# Patient Record
Sex: FEMALE | Race: WHITE | NOT HISPANIC OR LATINO | URBAN - NONMETROPOLITAN AREA
[De-identification: names, ages, dates, MRNs, and addresses within clinical notes are randomized per-mention and may not be internally consistent; named-entity substitution may affect disease eponyms.]

---

## 2019-01-03 ENCOUNTER — IMPORTED ENCOUNTER (OUTPATIENT)
Dept: URBAN - NONMETROPOLITAN AREA CLINIC 1 | Facility: CLINIC | Age: 84
End: 2019-01-03

## 2019-01-03 PROBLEM — Z98.42: Noted: 2019-01-03

## 2019-01-03 PROCEDURE — 92136 OPHTHALMIC BIOMETRY: CPT

## 2019-01-03 PROCEDURE — 99024 POSTOP FOLLOW-UP VISIT: CPT

## 2019-01-03 PROCEDURE — 66984 XCAPSL CTRC RMVL W/O ECP: CPT

## 2019-01-03 NOTE — PATIENT DISCUSSION
Same day s/p PCIOL OS-Pt doing well s/p PCIOL. -Continue post-op gtts according to instruction sheet and sleep with eye shield over eye for 7 nights.-Avoid bending at the waist lifting anything over 5lbs and dirty or cyrus environments. -RTC in 1week with Dr. Bronson Martino.

## 2021-08-18 NOTE — PATIENT DISCUSSION
- Discussed the risks, benefits, and alternatives of cataract surgery including but not limited to infection, bleeding, posterior capsular tear, need for additional surgery including secondary IOL or vitrectomy, the continued need to wear glasses at both distance and near, and permanent loss of vision. There is no guarantee that cataract surgery will improve the patient's vision or reduce glare/halos.  The patient appears to understand and wishes to proceed with surgery, OD

## 2021-08-18 NOTE — PATIENT DISCUSSION
Continue: prednisol ace-gatiflox-bromfen (prednisol ace-gatiflox-bromfen): drops,suspension: 1-0.5-0.075% 1 drop three times a day opht

## 2022-04-10 ASSESSMENT — VISUAL ACUITY
OD_CC: 20/20
OS_CC: 20/80
OS_PH: 20/40-

## 2022-04-10 ASSESSMENT — TONOMETRY
OD_IOP_MMHG: 12
OS_IOP_MMHG: 14

## 2023-08-09 NOTE — PATIENT DISCUSSION
- Discussed the risks, benefits, and alternatives of cataract surgery and the patient wishes to proceed with surgery
- Discussed the warning signs of retinal detachment
- Follow up for cataract testing next available
- Follow up in 1 year
- Has a clear lens implant in OS (Dr. Shell Huff 2018)
- History of Myopic LASIK, will need ORA
- No retinal tears or breaks
- Subacute by history
- Visually and functionally significant
Cataract OD
General:
POSTERIOR VITREOUS DETACHMENT, OD:
4 = No assist / stand by assistance